# Patient Record
Sex: FEMALE | Race: BLACK OR AFRICAN AMERICAN | NOT HISPANIC OR LATINO | Employment: UNEMPLOYED | URBAN - METROPOLITAN AREA
[De-identification: names, ages, dates, MRNs, and addresses within clinical notes are randomized per-mention and may not be internally consistent; named-entity substitution may affect disease eponyms.]

---

## 2024-01-23 ENCOUNTER — HOSPITAL ENCOUNTER (EMERGENCY)
Facility: CLINIC | Age: 44
Discharge: HOME | End: 2024-01-23
Attending: PHYSICIAN ASSISTANT | Admitting: PHYSICIAN ASSISTANT
Payer: COMMERCIAL

## 2024-01-23 ENCOUNTER — APPOINTMENT (OUTPATIENT)
Dept: CT IMAGING | Facility: CLINIC | Age: 44
End: 2024-01-23
Attending: EMERGENCY MEDICINE
Payer: COMMERCIAL

## 2024-01-23 VITALS
BODY MASS INDEX: 25.52 KG/M2 | HEIGHT: 60 IN | WEIGHT: 130 LBS | OXYGEN SATURATION: 100 % | DIASTOLIC BLOOD PRESSURE: 91 MMHG | TEMPERATURE: 99.1 F | SYSTOLIC BLOOD PRESSURE: 128 MMHG | HEART RATE: 113 BPM

## 2024-01-23 DIAGNOSIS — Z11.3 SCREEN FOR STD (SEXUALLY TRANSMITTED DISEASE): ICD-10-CM

## 2024-01-23 DIAGNOSIS — D25.9 UTERINE LEIOMYOMA, UNSPECIFIED LOCATION: ICD-10-CM

## 2024-01-23 DIAGNOSIS — R10.32 LLQ ABDOMINAL PAIN: ICD-10-CM

## 2024-01-23 DIAGNOSIS — D64.9 ANEMIA, UNSPECIFIED TYPE: ICD-10-CM

## 2024-01-23 LAB
ALBUMIN SERPL BCG-MCNC: 3.5 G/DL (ref 3.5–5.2)
ALBUMIN UR-MCNC: NEGATIVE MG/DL
ALP SERPL-CCNC: 89 U/L (ref 40–150)
ALT SERPL W P-5'-P-CCNC: 10 U/L (ref 0–50)
ANION GAP SERPL CALCULATED.3IONS-SCNC: 7 MMOL/L (ref 7–15)
APPEARANCE UR: CLEAR
AST SERPL W P-5'-P-CCNC: 26 U/L (ref 0–45)
BASOPHILS # BLD AUTO: 0 10E3/UL (ref 0–0.2)
BASOPHILS NFR BLD AUTO: 0 %
BILIRUB SERPL-MCNC: 0.3 MG/DL
BILIRUB UR QL STRIP: NEGATIVE
BUN SERPL-MCNC: 5.6 MG/DL (ref 6–20)
CALCIUM SERPL-MCNC: 8.6 MG/DL (ref 8.6–10)
CHLORIDE SERPL-SCNC: 103 MMOL/L (ref 98–107)
COLOR UR AUTO: ABNORMAL
CREAT SERPL-MCNC: 0.59 MG/DL (ref 0.51–0.95)
DEPRECATED HCO3 PLAS-SCNC: 27 MMOL/L (ref 22–29)
EGFRCR SERPLBLD CKD-EPI 2021: >90 ML/MIN/1.73M2
EOSINOPHIL # BLD AUTO: 0.1 10E3/UL (ref 0–0.7)
EOSINOPHIL NFR BLD AUTO: 2 %
ERYTHROCYTE [DISTWIDTH] IN BLOOD BY AUTOMATED COUNT: 20.2 % (ref 10–15)
GLUCOSE SERPL-MCNC: 78 MG/DL (ref 70–99)
GLUCOSE UR STRIP-MCNC: NEGATIVE MG/DL
HCG UR QL: NEGATIVE
HCT VFR BLD AUTO: 31 % (ref 35–47)
HGB BLD-MCNC: 9.5 G/DL (ref 11.7–15.7)
HGB UR QL STRIP: NEGATIVE
IMM GRANULOCYTES # BLD: 0 10E3/UL
IMM GRANULOCYTES NFR BLD: 0 %
KETONES UR STRIP-MCNC: ABNORMAL MG/DL
LEUKOCYTE ESTERASE UR QL STRIP: NEGATIVE
LYMPHOCYTES # BLD AUTO: 0.5 10E3/UL (ref 0.8–5.3)
LYMPHOCYTES NFR BLD AUTO: 15 %
MCH RBC QN AUTO: 25.4 PG (ref 26.5–33)
MCHC RBC AUTO-ENTMCNC: 30.6 G/DL (ref 31.5–36.5)
MCV RBC AUTO: 83 FL (ref 78–100)
MONOCYTES # BLD AUTO: 0.2 10E3/UL (ref 0–1.3)
MONOCYTES NFR BLD AUTO: 6 %
MUCOUS THREADS #/AREA URNS LPF: PRESENT /LPF
NEUTROPHILS # BLD AUTO: 2.5 10E3/UL (ref 1.6–8.3)
NEUTROPHILS NFR BLD AUTO: 77 %
NITRATE UR QL: NEGATIVE
NRBC # BLD AUTO: 0 10E3/UL
NRBC BLD AUTO-RTO: 0 /100
PH UR STRIP: 6.5 [PH] (ref 5–7)
PLATELET # BLD AUTO: 149 10E3/UL (ref 150–450)
POTASSIUM SERPL-SCNC: 3.3 MMOL/L (ref 3.4–5.3)
PROT SERPL-MCNC: 8.2 G/DL (ref 6.4–8.3)
RBC # BLD AUTO: 3.74 10E6/UL (ref 3.8–5.2)
RBC URINE: 0 /HPF
SODIUM SERPL-SCNC: 137 MMOL/L (ref 135–145)
SP GR UR STRIP: 1.02 (ref 1–1.03)
SQUAMOUS EPITHELIAL: 2 /HPF
UROBILINOGEN UR STRIP-MCNC: NORMAL MG/DL
WBC # BLD AUTO: 3.2 10E3/UL (ref 4–11)
WBC URINE: 1 /HPF

## 2024-01-23 PROCEDURE — 81025 URINE PREGNANCY TEST: CPT | Performed by: EMERGENCY MEDICINE

## 2024-01-23 PROCEDURE — 81001 URINALYSIS AUTO W/SCOPE: CPT | Performed by: EMERGENCY MEDICINE

## 2024-01-23 PROCEDURE — 250N000011 HC RX IP 250 OP 636: Performed by: PHYSICIAN ASSISTANT

## 2024-01-23 PROCEDURE — 99285 EMERGENCY DEPT VISIT HI MDM: CPT | Mod: 25

## 2024-01-23 PROCEDURE — 87491 CHLMYD TRACH DNA AMP PROBE: CPT | Performed by: PHYSICIAN ASSISTANT

## 2024-01-23 PROCEDURE — 250N000013 HC RX MED GY IP 250 OP 250 PS 637: Performed by: PHYSICIAN ASSISTANT

## 2024-01-23 PROCEDURE — 87591 N.GONORRHOEAE DNA AMP PROB: CPT | Performed by: PHYSICIAN ASSISTANT

## 2024-01-23 PROCEDURE — 74177 CT ABD & PELVIS W/CONTRAST: CPT

## 2024-01-23 PROCEDURE — 80053 COMPREHEN METABOLIC PANEL: CPT | Performed by: EMERGENCY MEDICINE

## 2024-01-23 PROCEDURE — 85025 COMPLETE CBC W/AUTO DIFF WBC: CPT | Performed by: EMERGENCY MEDICINE

## 2024-01-23 PROCEDURE — 36415 COLL VENOUS BLD VENIPUNCTURE: CPT | Performed by: EMERGENCY MEDICINE

## 2024-01-23 PROCEDURE — 250N000009 HC RX 250: Performed by: PHYSICIAN ASSISTANT

## 2024-01-23 PROCEDURE — 96374 THER/PROPH/DIAG INJ IV PUSH: CPT | Mod: 59

## 2024-01-23 RX ORDER — CYCLOBENZAPRINE HCL 10 MG
10 TABLET ORAL ONCE
Status: COMPLETED | OUTPATIENT
Start: 2024-01-23 | End: 2024-01-23

## 2024-01-23 RX ORDER — KETOROLAC TROMETHAMINE 15 MG/ML
15 INJECTION, SOLUTION INTRAMUSCULAR; INTRAVENOUS ONCE
Status: COMPLETED | OUTPATIENT
Start: 2024-01-23 | End: 2024-01-23

## 2024-01-23 RX ORDER — IOPAMIDOL 755 MG/ML
65 INJECTION, SOLUTION INTRAVASCULAR ONCE
Status: COMPLETED | OUTPATIENT
Start: 2024-01-23 | End: 2024-01-23

## 2024-01-23 RX ADMIN — CYCLOBENZAPRINE HYDROCHLORIDE 10 MG: 10 TABLET, FILM COATED ORAL at 18:31

## 2024-01-23 RX ADMIN — KETOROLAC TROMETHAMINE 15 MG: 15 INJECTION, SOLUTION INTRAMUSCULAR; INTRAVENOUS at 18:20

## 2024-01-23 RX ADMIN — SODIUM CHLORIDE 60 ML: 9 INJECTION, SOLUTION INTRAVENOUS at 16:53

## 2024-01-23 RX ADMIN — IOPAMIDOL 65 ML: 755 INJECTION, SOLUTION INTRAVENOUS at 16:52

## 2024-01-23 ASSESSMENT — ACTIVITIES OF DAILY LIVING (ADL): ADLS_ACUITY_SCORE: 35

## 2024-01-23 NOTE — ED TRIAGE NOTES
Pt presents with  L low abd pain since this morning. No fever or chills, pain worse on palpation.      Triage Assessment (Adult)       Row Name 01/23/24 1237          Triage Assessment    Airway WDL WDL        Respiratory WDL    Respiratory WDL WDL        Skin Circulation/Temperature WDL    Skin Circulation/Temperature WDL WDL        Cardiac WDL    Cardiac WDL WDL        Peripheral/Neurovascular WDL    Peripheral Neurovascular WDL WDL        Cognitive/Neuro/Behavioral WDL    Cognitive/Neuro/Behavioral WDL WDL

## 2024-01-23 NOTE — ED PROVIDER NOTES
PIT/Triage Evaluation    Patient presented with abdominal pain in her left lower quadrant. She denies vomiting, fever, chills, diarrhea, hematuria vs shorter than usual period, or dysuria. She also denies any pain on her right side. She does have a history of cysts on her kidneys, but not on her ovaries. Note, she does endorse having a recent period that was shorter than usual.  History of nerve pain in back and leg, but not this area.    Exam is notable for:    Patient Vitals for the past 24 hrs:   BP Temp Pulse SpO2 Height Weight   01/23/24 1238 (!) 128/91 99.1  F (37.3  C) 113 100 % 1.524 m (5') 59 kg (130 lb)     Resp:  Non-labored  Neuro:  Alert and cooperative  MSkel:  Moving all extremities  Skin:  No rash  GI:  No tenderness R abdomen, central, LUQ.  LLQ hyperesthesic and painful to light touch.  No rash noted in this area.  Visualization of full dermatome limited in triage by her pain with movements and positioning of her clothing while sitting.  No obvious bulge but hernia exam limited in triage.    Appropriate interventions for symptom management were initiated if applicable.  Appropriate diagnostic tests were initiated if indicated.    Important information for subsequent clinician:    I briefly evaluated the patient and developed an initial plan of care. I discussed this plan and explained that this brief interaction does not constitute a full evaluation. Patient/family understands that they should wait to be fully evaluated and discuss any test results with another clinician prior to leaving the hospital.    Scribe Disclosure:  I, Amanda Mitchell, am serving as a scribe at 12:36 PM on 1/23/2024 to document services personally performed by Dinorah Coreas MD based on my observations and the provider's statements to me.       Dinorah Coreas MD  01/23/24 8848    2:58 PM I spoke to lab since UA was back but not UPT.  They will run it now.       Dinorah Coreas MD  01/23/24 6475

## 2024-01-24 LAB
C TRACH DNA SPEC QL NAA+PROBE: NEGATIVE
N GONORRHOEA DNA SPEC QL NAA+PROBE: NEGATIVE

## 2024-01-24 NOTE — ED PROVIDER NOTES
History     Chief Complaint:  Abdominal Pain       HPI   Blayne Estes is a 43 year old female with history of HIV who presents with sudden onset LLQ abdominal pain upon waking this morning. She reports no fevers, chills, vomiting, urinary or bowel complaints. No dysuria. Patient states she has intense pain on the skin overlying her LLQ abdomen. Prior abdominal surgeries include C-sections. No vaginal symptoms however she would like STD testing stating due to new partner. She reports no recent illness.      Independent Historian:   None - Patient Only    Review of External Notes:   None       Medications:    No current outpatient medications on file.      Past Medical History:    History reviewed. No pertinent past medical history.    Past Surgical History:    History reviewed. No pertinent surgical history.     Physical Exam   Patient Vitals for the past 24 hrs:   BP Temp Pulse SpO2 Height Weight   01/23/24 1238 (!) 128/91 99.1  F (37.3  C) 113 100 % 1.524 m (5') 59 kg (130 lb)        Physical Exam  Constitutional: Alert, attentive, GCS 15  HENT:    Nose: Nose normal.    Mouth/Throat: Oropharynx is clear, mucous membranes are moist  Eyes:  EOM are normal.    CV:  regular rate and rhythm; no murmurs, rubs or gallups  Chest: Effort normal and breath sounds normal.   GI:   Epigastrium - no tenderness, no guarding   RUQ - no tenderness or Christiansen's sign   RLQ - No tenderness, no guarding, no Rovsing's sign   Suprapubic area - no tenderness, no guarding    LLQ - tenderness without guarding. No rebound tenderness.   LUQ - no tenderness, no guarding   No distension. Normal bowel sounds  MSK: Normal range of motion.   Neurological: Alert, attentive  Skin: Skin is warm and dry. No skin rash, no vesicles.      Emergency Department Course       Imaging:  CT Abdomen Pelvis w Contrast   Final Result   IMPRESSION:       1.  No acute abnormality or specific cause of abdominal pain are identified.      2.  Multiple uterine  fibroids.         Laboratory:  Labs Ordered and Resulted from Time of ED Arrival to Time of ED Departure   COMPREHENSIVE METABOLIC PANEL - Abnormal       Result Value    Sodium 137      Potassium 3.3 (*)     Carbon Dioxide (CO2) 27      Anion Gap 7      Urea Nitrogen 5.6 (*)     Creatinine 0.59      GFR Estimate >90      Calcium 8.6      Chloride 103      Glucose 78      Alkaline Phosphatase 89      AST 26      ALT 10      Protein Total 8.2      Albumin 3.5      Bilirubin Total 0.3     ROUTINE UA WITH MICROSCOPIC - Abnormal    Color Urine Light Yellow      Appearance Urine Clear      Glucose Urine Negative      Bilirubin Urine Negative      Ketones Urine Trace (*)     Specific Gravity Urine 1.016      Blood Urine Negative      pH Urine 6.5      Protein Albumin Urine Negative      Urobilinogen Urine Normal      Nitrite Urine Negative      Leukocyte Esterase Urine Negative      Mucus Urine Present (*)     RBC Urine 0      WBC Urine 1      Squamous Epithelials Urine 2 (*)    CBC WITH PLATELETS AND DIFFERENTIAL - Abnormal    WBC Count 3.2 (*)     RBC Count 3.74 (*)     Hemoglobin 9.5 (*)     Hematocrit 31.0 (*)     MCV 83      MCH 25.4 (*)     MCHC 30.6 (*)     RDW 20.2 (*)     Platelet Count 149 (*)     % Neutrophils 77      % Lymphocytes 15      % Monocytes 6      % Eosinophils 2      % Basophils 0      % Immature Granulocytes 0      NRBCs per 100 WBC 0      Absolute Neutrophils 2.5      Absolute Lymphocytes 0.5 (*)     Absolute Monocytes 0.2      Absolute Eosinophils 0.1      Absolute Basophils 0.0      Absolute Immature Granulocytes 0.0      Absolute NRBCs 0.0     HCG QUALITATIVE URINE - Normal    hCG Urine Qualitative Negative     NEISSERIA GONORRHOEAE PCR   CHLAMYDIA TRACHOMATIS PCR        Emergency Department Course & Assessments:         Interventions:  Medications   iopamidol (ISOVUE-370) solution 65 mL (65 mLs Intravenous $Given 1/23/24 1652)   Saline Flush (60 mLs Intravenous $Given 1/23/24 1653)   ketorolac  (TORADOL) injection 15 mg (15 mg Intravenous $Given 1/23/24 1820)   cyclobenzaprine (FLEXERIL) tablet 10 mg (10 mg Oral $Given 1/23/24 1831)        Assessments:  1800  I obtained patient history and performed physical examination as above.      Independent Interpretation (X-rays, CTs, rhythm strip):  None    Consultations/Discussion of Management or Tests:  None        Social Determinants of Health affecting care:   None    Disposition:  The patient was discharged to home.     Impression & Plan    CMS Diagnoses: None      Medical Decision Making:  Patient is a well-appearing 43-year-old female who presents with left lower quadrant tenderness that awoke her this morning.  She is afebrile, mildly hypertensive at 120/91, nonhypoxic.  Mildly tachycardic with rates in the 110s.  Physical examination reveals reproducible left lower quadrant tenderness without peritoneal signs.  No evidence of rash to suggest shingles.  Labs today significant for mild leukopenia at 3.2 and anemia with a hemoglobin at 9.5.  These values are baseline for patient given her labs over the past 6 months.  Mild hypokalemia also noted at 3.3.  hCG is negative.  UA is negative for infection and hematuria.  CT scan of the abdomen shows no acute process however incidental uterine fibroids are identified.  Results reviewed and discussed with patient.  No evidence of acute intra-abdominal process and patient is already aware of previous uterine fibroids.  No evidence of cutaneous process such as shingles.  Patient reports she has a history of lumbar disc disease and states that the pain in her back does wrap around her left side similar to previous exacerbations of her pain.  She takes Flexeril for her pain and this was also provided today.  She had symptomatic relief with above medications.  Low suspicion for other process such as PID given she is afebrile and without vaginal complaints at this time. She requests STD screen due to new partner and she  is aware that she will be called with the results of her STD testing if antibiotics indicated. She is otherwise well-appearing and hemodynamically stable.  Recommend follow-up with primary care and patient states she already has a scheduled appointment with her PCP.  Supportive cares and return precautions to the ED were discussed and patient was discharged home in stable condition.      Diagnosis:    ICD-10-CM    1. LLQ abdominal pain  R10.32       2. Anemia, unspecified type  D64.9       3. Uterine leiomyoma, unspecified location  D25.9       4. Screen for STD (sexually transmitted disease)  Z11.3            Discharge Medications:  There are no discharge medications for this patient.           1/23/2024   Betty Carlson PA-C Steinbrueck, Emily, PA-C  01/23/24 8649